# Patient Record
Sex: FEMALE | Race: BLACK OR AFRICAN AMERICAN | Employment: UNEMPLOYED | URBAN - METROPOLITAN AREA
[De-identification: names, ages, dates, MRNs, and addresses within clinical notes are randomized per-mention and may not be internally consistent; named-entity substitution may affect disease eponyms.]

---

## 2023-11-10 ENCOUNTER — HOSPITAL ENCOUNTER (EMERGENCY)
Facility: HOSPITAL | Age: 23
Discharge: HOME | End: 2023-11-10

## 2023-11-10 VITALS
BODY MASS INDEX: 29.16 KG/M2 | WEIGHT: 175 LBS | HEART RATE: 102 BPM | SYSTOLIC BLOOD PRESSURE: 125 MMHG | DIASTOLIC BLOOD PRESSURE: 83 MMHG | TEMPERATURE: 98.7 F | HEIGHT: 65 IN | RESPIRATION RATE: 16 BRPM | OXYGEN SATURATION: 100 %

## 2023-11-10 DIAGNOSIS — L02.419 AXILLARY ABSCESS: Primary | ICD-10-CM

## 2023-11-10 PROCEDURE — 10061 I&D ABSCESS COMP/MULTIPLE: CPT

## 2023-11-10 PROCEDURE — 99283 EMERGENCY DEPT VISIT LOW MDM: CPT

## 2023-11-10 RX ORDER — CEPHALEXIN 500 MG/1
500 CAPSULE ORAL 4 TIMES DAILY
Qty: 28 CAPSULE | Refills: 0 | Status: SHIPPED | OUTPATIENT
Start: 2023-11-10

## 2023-11-10 RX ORDER — IBUPROFEN 800 MG/1
800 TABLET ORAL EVERY 8 HOURS PRN
Qty: 30 TABLET | Refills: 1 | Status: SHIPPED | OUTPATIENT
Start: 2023-11-10

## 2023-11-10 RX ORDER — DOXYCYCLINE HYCLATE 100 MG
100 TABLET ORAL 2 TIMES DAILY
Qty: 20 TABLET | Refills: 0 | Status: SHIPPED | OUTPATIENT
Start: 2023-11-10 | End: 2023-11-20

## 2023-11-10 RX ORDER — TRAZODONE HYDROCHLORIDE 50 MG/1
50 TABLET ORAL NIGHTLY
COMMUNITY

## 2023-11-10 RX ORDER — BUPROPION HYDROCHLORIDE 150 MG/1
150 TABLET ORAL EVERY MORNING
COMMUNITY

## 2023-11-10 ASSESSMENT — ENCOUNTER SYMPTOMS
ABDOMINAL PAIN: 0
BACK PAIN: 0
SHORTNESS OF BREATH: 0
ROS SKIN COMMENTS: ABSCESS

## 2023-11-10 ASSESSMENT — PAIN DESCRIPTION - ORIENTATION: ORIENTATION: INNER;LEFT

## 2023-11-10 ASSESSMENT — PAIN DESCRIPTION - LOCATION: LOCATION: ARM

## 2023-11-10 ASSESSMENT — PAIN DESCRIPTION - DESCRIPTORS: DESCRIPTORS: ACHING;SORE

## 2023-11-10 ASSESSMENT — PAIN - FUNCTIONAL ASSESSMENT: PAIN_FUNCTIONAL_ASSESSMENT: 0-10

## 2023-11-10 ASSESSMENT — PAIN SCALES - GENERAL: PAINLEVEL_OUTOF10: 8

## 2023-11-10 NOTE — ED PROVIDER NOTES
[facility] EMERGENCY DEPT  EMERGENCY DEPARTMENT ENCOUNTER       Pt Name: Ezekiel Allen  MRN: [id number]  [address] [facility] [address]  Date of evaluation: 11/10/2023  Provider: JESSIE Delatorre NP   PCP: No primary care provider on file. Note Started: 6:34 PM 11/10/23     CHIEF COMPLAINT       Chief Complaint   Patient presents with    Abscess        HISTORY OF PRESENT ILLNESS: 1 or more elements      History Provided by: Patient   History is limited by: Nothing     Ezekiel Allen is a 21 y.o. female who presents to the ER reports abscess left axilla acute onset 2 days ago. Denies history of abscesses. Reports pain on palpation. Denies drainage. Denies fever. Nursing Notes were all reviewed and agreed with or any disagreements were addressed in the HPI. REVIEW OF SYSTEMS      Review of Systems   Constitutional:  Negative for fever. HENT:  Negative for congestion. Eyes:  Negative for visual disturbance. Respiratory:  Negative for shortness of breath. Cardiovascular:  Negative for chest pain. Gastrointestinal:  Negative for abdominal pain. Genitourinary:  Negative for difficulty urinating. Musculoskeletal:  Negative for back pain and neck pain. Skin:  Negative for rash. abscess   Neurological:  Negative for dizziness, weakness and headaches. Psychiatric/Behavioral:  Negative for behavioral problems. All other systems reviewed and are negative. Positives and Pertinent negatives as per HPI. PAST HISTORY     Past Medical History:  Past Medical History:   Diagnosis Date    Anxiety     Depression        Past Surgical History:  History reviewed. No pertinent surgical history. Family History:  History reviewed. No pertinent family history.     Social History:  Social History     Tobacco Use    Smoking status: Every Day     Types: Cigarettes    Smokeless tobacco: Never   Substance Use Topics    Alcohol use: Yes    Drug use: Yes     Types: Marijuana (Weed)       Allergies:  No

## 2023-11-12 ENCOUNTER — HOSPITAL ENCOUNTER (EMERGENCY)
Facility: HOSPITAL | Age: 23
Discharge: HOME | End: 2023-11-12

## 2023-11-12 VITALS
SYSTOLIC BLOOD PRESSURE: 118 MMHG | HEART RATE: 94 BPM | OXYGEN SATURATION: 100 % | TEMPERATURE: 97.3 F | BODY MASS INDEX: 29.12 KG/M2 | RESPIRATION RATE: 16 BRPM | DIASTOLIC BLOOD PRESSURE: 80 MMHG | WEIGHT: 175 LBS

## 2023-11-12 DIAGNOSIS — Z51.89 WOUND CHECK, ABSCESS: Primary | ICD-10-CM

## 2023-11-12 PROCEDURE — 99282 EMERGENCY DEPT VISIT SF MDM: CPT

## 2023-11-12 ASSESSMENT — ENCOUNTER SYMPTOMS
SHORTNESS OF BREATH: 0
BACK PAIN: 0
ABDOMINAL PAIN: 0

## 2023-11-12 ASSESSMENT — PAIN - FUNCTIONAL ASSESSMENT: PAIN_FUNCTIONAL_ASSESSMENT: 0-10

## 2023-11-12 ASSESSMENT — PAIN DESCRIPTION - LOCATION: LOCATION: OTHER (COMMENT)

## 2023-11-12 ASSESSMENT — PAIN SCALES - GENERAL: PAINLEVEL_OUTOF10: 3

## 2023-11-12 ASSESSMENT — PAIN DESCRIPTION - ORIENTATION: ORIENTATION: LEFT

## 2023-11-12 ASSESSMENT — LIFESTYLE VARIABLES
HOW MANY STANDARD DRINKS CONTAINING ALCOHOL DO YOU HAVE ON A TYPICAL DAY: PATIENT DOES NOT DRINK
HOW OFTEN DO YOU HAVE A DRINK CONTAINING ALCOHOL: NEVER

## 2023-11-12 ASSESSMENT — PAIN DESCRIPTION - PAIN TYPE: TYPE: ACUTE PAIN

## 2023-11-12 ASSESSMENT — PAIN DESCRIPTION - DESCRIPTORS: DESCRIPTORS: SORE

## 2023-11-12 NOTE — ED NOTES
Pt discharged by provider and given instructions. Unable to reassess pain level.       Estrellita Keith RN  11/12/23 1874

## 2023-11-13 NOTE — ED PROVIDER NOTES
[address] EMERGENCY DEPT  EMERGENCY DEPARTMENT ENCOUNTER       Pt Name: Rowan Smart  MRN: [id number]  [address] [facility] [employer] [address]  Date of evaluation: 11/12/2023  Provider: JESSIE Dyer NP   PCP: No primary care provider on file. Note Started: 11:22 PM 11/12/23     CHIEF COMPLAINT       Chief Complaint   Patient presents with    Wound Check        HISTORY OF PRESENT ILLNESS: 1 or more elements      History Provided by: Patient   History is limited by: Nothing     Rowan Smart is a 21 y.o. female who presents to the emergency department for wound recheck. Patient had axillary abscess incised 2 days ago in this department. Denies pain denies drainage taking antibiotics as prescribed     Nursing Notes were all reviewed and agreed with or any disagreements were addressed in the HPI. REVIEW OF SYSTEMS      Review of Systems   Constitutional:  Negative for fever. HENT:  Negative for congestion. Eyes:  Negative for visual disturbance. Respiratory:  Negative for shortness of breath. Cardiovascular:  Negative for chest pain. Gastrointestinal:  Negative for abdominal pain. Genitourinary:  Negative for difficulty urinating. Musculoskeletal:  Negative for back pain and neck pain. Skin:  Positive for wound. Negative for rash. Neurological:  Negative for dizziness, weakness and headaches. Psychiatric/Behavioral:  Negative for behavioral problems. All other systems reviewed and are negative. Positives and Pertinent negatives as per HPI. PAST HISTORY     Past Medical History:  Past Medical History:   Diagnosis Date    Anxiety     Depression        Past Surgical History:  History reviewed. No pertinent surgical history. Family History:  History reviewed. No pertinent family history.     Social History:  Social History     Tobacco Use    Smoking status: Every Day     Types: Cigarettes    Smokeless tobacco: Never   Substance Use Topics    Alcohol use: Yes    Drug use: Yes     Types: